# Patient Record
Sex: FEMALE | Race: OTHER | HISPANIC OR LATINO
[De-identification: names, ages, dates, MRNs, and addresses within clinical notes are randomized per-mention and may not be internally consistent; named-entity substitution may affect disease eponyms.]

---

## 2019-08-21 PROBLEM — Z00.00 ENCOUNTER FOR PREVENTIVE HEALTH EXAMINATION: Status: ACTIVE | Noted: 2019-08-21

## 2019-09-20 ENCOUNTER — APPOINTMENT (OUTPATIENT)
Dept: GASTROENTEROLOGY | Facility: CLINIC | Age: 59
End: 2019-09-20
Payer: COMMERCIAL

## 2019-09-20 VITALS
WEIGHT: 204 LBS | SYSTOLIC BLOOD PRESSURE: 161 MMHG | HEIGHT: 62 IN | DIASTOLIC BLOOD PRESSURE: 78 MMHG | BODY MASS INDEX: 37.54 KG/M2 | HEART RATE: 78 BPM

## 2019-09-20 DIAGNOSIS — Z78.9 OTHER SPECIFIED HEALTH STATUS: ICD-10-CM

## 2019-09-20 DIAGNOSIS — Z86.39 PERSONAL HISTORY OF OTHER ENDOCRINE, NUTRITIONAL AND METABOLIC DISEASE: ICD-10-CM

## 2019-09-20 DIAGNOSIS — Z86.79 PERSONAL HISTORY OF OTHER DISEASES OF THE CIRCULATORY SYSTEM: ICD-10-CM

## 2019-09-20 PROCEDURE — 99204 OFFICE O/P NEW MOD 45 MIN: CPT

## 2019-09-20 RX ORDER — LOVASTATIN 40 MG/1
TABLET ORAL
Refills: 0 | Status: ACTIVE | COMMUNITY

## 2019-09-20 RX ORDER — LOSARTAN POTASSIUM AND HYDROCHLOROTHIAZIDE 12.5; 5 MG/1; MG/1
50-12.5 TABLET ORAL
Refills: 0 | Status: ACTIVE | COMMUNITY

## 2019-09-20 RX ORDER — ASPIRIN 81 MG
81 TABLET, DELAYED RELEASE (ENTERIC COATED) ORAL
Refills: 0 | Status: ACTIVE | COMMUNITY

## 2019-09-20 RX ORDER — LORATADINE 5 MG
17 TABLET,CHEWABLE ORAL DAILY
Qty: 1 | Refills: 4 | Status: ACTIVE | COMMUNITY
Start: 2019-09-20 | End: 1900-01-01

## 2019-09-20 RX ORDER — WHEAT DEXTRIN 3 G/4 G
POWDER (GRAM) ORAL
Qty: 1 | Refills: 2 | Status: ACTIVE | COMMUNITY
Start: 2019-09-20 | End: 1900-01-01

## 2019-09-20 NOTE — PHYSICAL EXAM
[General Appearance - Alert] : alert [General Appearance - In No Acute Distress] : in no acute distress [Sclera] : the sclera and conjunctiva were normal [PERRL With Normal Accommodation] : pupils were equal in size, round, and reactive to light [Extraocular Movements] : extraocular movements were intact [Outer Ear] : the ears and nose were normal in appearance [Oropharynx] : the oropharynx was normal [Neck Appearance] : the appearance of the neck was normal [Neck Cervical Mass (___cm)] : no neck mass was observed [Jugular Venous Distention Increased] : there was no jugular-venous distention [Thyroid Diffuse Enlargement] : the thyroid was not enlarged [Thyroid Nodule] : there were no palpable thyroid nodules [Auscultation Breath Sounds / Voice Sounds] : lungs were clear to auscultation bilaterally [Heart Rate And Rhythm] : heart rate was normal and rhythm regular [Heart Sounds] : normal S1 and S2 [Heart Sounds Gallop] : no gallops [Murmurs] : no murmurs [Heart Sounds Pericardial Friction Rub] : no pericardial rub [Bowel Sounds] : normal bowel sounds [Abdomen Soft] : soft [Abdomen Tenderness] : non-tender [Abdomen Mass (___ Cm)] : no abdominal mass palpated [Abnormal Walk] : normal gait [Musculoskeletal - Swelling] : no joint swelling seen [Nail Clubbing] : no clubbing  or cyanosis of the fingernails [Motor Tone] : muscle strength and tone were normal [Skin Turgor] : normal skin turgor [Skin Color & Pigmentation] : normal skin color and pigmentation [No Focal Deficits] : no focal deficits [Oriented To Time, Place, And Person] : oriented to person, place, and time [Affect] : the affect was normal [Impaired Insight] : insight and judgment were intact [General Appearance - Well Nourished] : well nourished [General Appearance - Well-Appearing] : healthy appearing [General Appearance - Well Developed] : well developed [FreeTextEntry1] : overweight [] : normal voice and communication [Hearing Threshold Finger Rub Not [place]] : hearing was normal [Examination Of The Oral Cavity] : the lips and gums were normal [No CVA Tenderness] : no ~M costovertebral angle tenderness [No Spinal Tenderness] : no spinal tenderness [Motor Exam] : the motor exam was normal

## 2019-09-20 NOTE — HISTORY OF PRESENT ILLNESS
[Heartburn] : denies heartburn [Nausea] : denies nausea [Vomiting] : denies vomiting [Diarrhea] : denies diarrhea [Constipation] : improvement in constipation [Yellow Skin Or Eyes (Jaundice)] : denies jaundice [Abdominal Pain] : improved abdominal pain [Abdominal Swelling] : denies abdominal swelling [Rectal Pain] : denies rectal pain [de-identified] : WESTON CORRALES is a 59 year old Korean speaking female presenting today with complaints of constipation and bloating. She is a rather poor historian. She reports that she has always suffered from constipation, usually moving her bowels every 3 days. However, she reports that it has been worse in the last year or so but cannot quantify exactly how long. She states that she gets lower abdominal pain and cramping but again cannot state how often she gets it or how long it lasts. She reports bloating after meals. She denies any blood in the stool. She denies any upper GI symptoms such as nausea, vomiting, heart burn, acid regurgitation, or upper abdominal pain. She states that she went to her PCP at some point in the last few months, but again can't give a rough estimate, and was given "some pills and a liquid for constipation" but does not know what the medications were. She reports that they helped and she has felt better the last 2 weeks and has been having small bowel movements daily, however she stopped taking the meds once she started to feel better. She reports past use of Metamucil as well however she felt it did not fully help and stopped taking it. She reports having a colonoscopy with Dr. Johnson in the past but does not know how long ago. She states it "could have been 3-5 year or more". When asked if they found anything, she states that she doesn't remember but then said that they "did say there was something but I don't know what". When prompted about when the told her to follow up she said 10 years. \[facility]  Luis ID# [id number]

## 2019-09-20 NOTE — ASSESSMENT
[FreeTextEntry1] : The patient presents today with chronic constipation. She will start Benefiber 1 tablespoon in 6-8 oz of fluid every morning and MiraLAX 1 capful in 6-8 oz of fluid every evening. She will follow up in 6-8 weeks.\par We will obtain her previous records from Dr. Johnson's office. A decision as to the necessity of a followup colonoscopy or a CAT scan will depend upon her response to therapy and review of the prior colonoscopy report.

## 2019-11-08 ENCOUNTER — APPOINTMENT (OUTPATIENT)
Dept: GASTROENTEROLOGY | Facility: CLINIC | Age: 59
End: 2019-11-08
Payer: COMMERCIAL

## 2019-11-08 VITALS
DIASTOLIC BLOOD PRESSURE: 80 MMHG | SYSTOLIC BLOOD PRESSURE: 150 MMHG | HEIGHT: 62 IN | WEIGHT: 202 LBS | BODY MASS INDEX: 37.17 KG/M2 | RESPIRATION RATE: 15 BRPM | OXYGEN SATURATION: 98 % | HEART RATE: 56 BPM

## 2019-11-08 DIAGNOSIS — K59.00 CONSTIPATION, UNSPECIFIED: ICD-10-CM

## 2019-11-08 DIAGNOSIS — R14.0 ABDOMINAL DISTENSION (GASEOUS): ICD-10-CM

## 2019-11-08 PROCEDURE — 99214 OFFICE O/P EST MOD 30 MIN: CPT

## 2019-11-08 NOTE — ASSESSMENT
[FreeTextEntry1] : At this time I recommended that she increase Benefiber to one scoop twice daily and that she add MiraLax every evening. I will once again try to obtain the results of her prior colonoscopy. According to the patient she was told she should undergo followup exam in 10 years in the date of the last colonoscopy. She will call in 2 months for the results and any further followup will depend upon my review of that exam.

## 2019-11-08 NOTE — HISTORY OF PRESENT ILLNESS
[de-identified] : Since her last visit I have been unable to obtain the results of her last colonoscopy by Dr. Johnson. She has been taking one scoop Benefiber daily but has not been taking the MiraLax. Her bowel movements are somewhat more regular but they are still somewhat firm. There is no abdominal pain. Her appetite and weight continued to be stable. There is no rectal bleeding or melena.

## 2019-11-08 NOTE — PHYSICAL EXAM
[General Appearance - Alert] : alert [General Appearance - In No Acute Distress] : in no acute distress [General Appearance - Well Nourished] : well nourished [General Appearance - Well Developed] : well developed [General Appearance - Well-Appearing] : healthy appearing [Sclera] : the sclera and conjunctiva were normal [PERRL With Normal Accommodation] : pupils were equal in size, round, and reactive to light [Extraocular Movements] : extraocular movements were intact [Outer Ear] : the ears and nose were normal in appearance [Hearing Threshold Finger Rub Not [place]] : hearing was normal [Examination Of The Oral Cavity] : the lips and gums were normal [Oropharynx] : the oropharynx was normal [Neck Appearance] : the appearance of the neck was normal [Respiration, Rhythm And Depth] : normal respiratory rhythm and effort [Heart Rate And Rhythm] : heart rate was normal and rhythm regular [Bowel Sounds] : normal bowel sounds [Abdomen Soft] : soft [Abdomen Tenderness] : non-tender [Abdomen Mass (___ Cm)] : no abdominal mass palpated [No CVA Tenderness] : no ~M costovertebral angle tenderness [No Spinal Tenderness] : no spinal tenderness [Abnormal Walk] : normal gait [Nail Clubbing] : no clubbing  or cyanosis of the fingernails [Musculoskeletal - Swelling] : no joint swelling seen [Motor Tone] : muscle strength and tone were normal [Skin Color & Pigmentation] : normal skin color and pigmentation [Skin Turgor] : normal skin turgor [] : no rash [Motor Exam] : the motor exam was normal [No Focal Deficits] : no focal deficits [Oriented To Time, Place, And Person] : oriented to person, place, and time [Affect] : the affect was normal [Impaired Insight] : insight and judgment were intact [FreeTextEntry1] : overweight

## 2020-01-29 ENCOUNTER — EMERGENCY (EMERGENCY)
Facility: HOSPITAL | Age: 60
LOS: 1 days | Discharge: HOME | End: 2020-01-29
Attending: EMERGENCY MEDICINE
Payer: COMMERCIAL

## 2020-01-29 VITALS
HEIGHT: 62 IN | WEIGHT: 205.03 LBS | SYSTOLIC BLOOD PRESSURE: 179 MMHG | OXYGEN SATURATION: 97 % | DIASTOLIC BLOOD PRESSURE: 75 MMHG | TEMPERATURE: 98 F | RESPIRATION RATE: 18 BRPM | HEART RATE: 73 BPM

## 2020-01-29 PROCEDURE — 71046 X-RAY EXAM CHEST 2 VIEWS: CPT | Mod: 26

## 2020-01-29 PROCEDURE — 71046 X-RAY EXAM CHEST 2 VIEWS: CPT

## 2020-01-29 PROCEDURE — 99283 EMERGENCY DEPT VISIT LOW MDM: CPT | Mod: 25

## 2020-01-29 PROCEDURE — 99284 EMERGENCY DEPT VISIT MOD MDM: CPT

## 2020-01-29 PROCEDURE — T1013: CPT

## 2020-01-29 RX ADMIN — Medication 60 MILLIGRAM(S): at 20:05

## 2020-01-29 NOTE — ED STATDOCS - PATIENT PORTAL LINK FT
You can access the FollowMyHealth Patient Portal offered by [facility] by registering at the following website: [email or web address]. By joining [facility]’s FollowMyHealth portal, you will also be able to view your health information using other applications (apps) compatible with our system.

## 2020-01-29 NOTE — ED ADULT TRIAGE NOTE - CHIEF COMPLAINT QUOTE
Patient arrived ambulatory to ED, awake, alert, and oriented times 3, breathing unlabored.  patient complaining of cough for 2 months.

## 2020-01-29 NOTE — ED STATDOCS - OBJECTIVE STATEMENT
58 y/o F pt with significant PMHx of HLD, and HTN presents to the ED c/o productive cough for 2 months with associated wheezing. Pt reports that she went to the doctor and was diagnosed with pneumonia. She stated she was put on levofloxacin 750mg once a day 5 days ago, Symbicort, and cough syrup on January 14th. Pt states that she isn't feeling any relief from medications. Pt states that her  has bronchitis and received a negative flu test.

## 2020-01-29 NOTE — ED STATDOCS - CLINICAL SUMMARY MEDICAL DECISION MAKING FREE TEXT BOX
pt presents with productive cough for 2 months, multiple doses of abx, on inhaled steroids currently. Will repeat xray to rule out PNA start on prednisone for bronchitis and have pt follow up with pulmonologist

## 2020-02-10 PROBLEM — Z78.9 OTHER SPECIFIED HEALTH STATUS: Chronic | Status: ACTIVE | Noted: 2020-02-06

## 2020-02-17 ENCOUNTER — APPOINTMENT (OUTPATIENT)
Dept: PULMONOLOGY | Facility: CLINIC | Age: 60
End: 2020-02-17
Payer: COMMERCIAL

## 2020-02-17 VITALS
SYSTOLIC BLOOD PRESSURE: 130 MMHG | HEART RATE: 73 BPM | OXYGEN SATURATION: 97 % | WEIGHT: 206 LBS | HEIGHT: 62 IN | DIASTOLIC BLOOD PRESSURE: 76 MMHG | BODY MASS INDEX: 37.91 KG/M2

## 2020-02-17 DIAGNOSIS — Z87.891 PERSONAL HISTORY OF NICOTINE DEPENDENCE: ICD-10-CM

## 2020-02-17 DIAGNOSIS — Z78.9 OTHER SPECIFIED HEALTH STATUS: ICD-10-CM

## 2020-02-17 DIAGNOSIS — Z87.01 PERSONAL HISTORY OF PNEUMONIA (RECURRENT): ICD-10-CM

## 2020-02-17 PROCEDURE — 99204 OFFICE O/P NEW MOD 45 MIN: CPT | Mod: 25

## 2020-02-17 PROCEDURE — 94664 DEMO&/EVAL PT USE INHALER: CPT | Mod: 59

## 2020-02-17 PROCEDURE — 94060 EVALUATION OF WHEEZING: CPT

## 2020-02-17 NOTE — REASON FOR VISIT
[Consultation] : a consultation [Abnormal CXR/ Chest CT] : an abnormal CXR/ chest CT [Cough] : cough

## 2020-02-17 NOTE — CONSULT LETTER
[Dear  ___] : Dear  [unfilled], [Please see my note below.] : Please see my note below. [FreeTextEntry3] : Renaldo Cooley MD\par  [Consult Closing:] : Thank you very much for allowing me to participate in the care of this patient.  If you have any questions, please do not hesitate to contact me. [Consult Letter:] : I had the pleasure of evaluating your patient, [unfilled].

## 2020-02-17 NOTE — PHYSICAL EXAM
[No Acute Distress] : no acute distress [Normal Oropharynx] : normal oropharynx [No Neck Mass] : no neck mass [Normal Appearance] : normal appearance [Normal S1, S2] : normal s1, s2 [No Murmurs] : no murmurs [Normal Rate/Rhythm] : normal rate/rhythm [Clear to Auscultation Bilaterally] : clear to auscultation bilaterally [No Abnormalities] : no abnormalities [No Resp Distress] : no resp distress [Benign] : benign [No Clubbing] : no clubbing [Normal Gait] : normal gait [No Cyanosis] : no cyanosis [No Edema] : no edema [FROM] : FROM [No Focal Deficits] : no focal deficits [Normal Color/ Pigmentation] : normal color/ pigmentation [Oriented x3] : oriented x3 [Normal Affect] : normal affect

## 2020-03-02 ENCOUNTER — APPOINTMENT (OUTPATIENT)
Dept: CT IMAGING | Facility: CLINIC | Age: 60
End: 2020-03-02

## 2020-03-30 ENCOUNTER — APPOINTMENT (OUTPATIENT)
Dept: PULMONOLOGY | Facility: CLINIC | Age: 60
End: 2020-03-30

## 2020-04-23 ENCOUNTER — APPOINTMENT (OUTPATIENT)
Dept: PULMONOLOGY | Facility: CLINIC | Age: 60
End: 2020-04-23

## 2020-04-23 RX ORDER — CLARITHROMYCIN 500 MG/1
500 TABLET, FILM COATED ORAL
Refills: 0 | Status: DISCONTINUED | COMMUNITY
End: 2020-04-23

## 2020-04-28 ENCOUNTER — APPOINTMENT (OUTPATIENT)
Dept: PULMONOLOGY | Facility: CLINIC | Age: 60
End: 2020-04-28

## 2020-05-08 ENCOUNTER — APPOINTMENT (OUTPATIENT)
Dept: PULMONOLOGY | Facility: CLINIC | Age: 60
End: 2020-05-08
Payer: COMMERCIAL

## 2020-05-08 VITALS
SYSTOLIC BLOOD PRESSURE: 128 MMHG | HEIGHT: 63 IN | DIASTOLIC BLOOD PRESSURE: 84 MMHG | BODY MASS INDEX: 36.14 KG/M2 | OXYGEN SATURATION: 98 % | WEIGHT: 204 LBS | HEART RATE: 77 BPM

## 2020-05-08 PROCEDURE — 99213 OFFICE O/P EST LOW 20 MIN: CPT

## 2020-05-08 NOTE — PHYSICAL EXAM
[No Acute Distress] : no acute distress [Normal Oropharynx] : normal oropharynx [No Neck Mass] : no neck mass [Normal Appearance] : normal appearance [Normal Rate/Rhythm] : normal rate/rhythm [Normal S1, S2] : normal s1, s2 [No Murmurs] : no murmurs [No Resp Distress] : no resp distress [Clear to Auscultation Bilaterally] : clear to auscultation bilaterally [No Abnormalities] : no abnormalities [Benign] : benign [Normal Gait] : normal gait [No Clubbing] : no clubbing [No Cyanosis] : no cyanosis [No Edema] : no edema [FROM] : FROM [Normal Color/ Pigmentation] : normal color/ pigmentation [No Focal Deficits] : no focal deficits [Oriented x3] : oriented x3 [Normal Affect] : normal affect

## 2020-05-08 NOTE — CONSULT LETTER
[Dear  ___] : Dear  [unfilled], [Courtesy Letter:] : I had the pleasure of seeing your patient, [unfilled], in my office today. [Please see my note below.] : Please see my note below. [Consult Closing:] : Thank you very much for allowing me to participate in the care of this patient.  If you have any questions, please do not hesitate to contact me. [Sincerely,] : Sincerely, [FreeTextEntry3] : Renaldo Cooley MD\par

## 2020-08-13 ENCOUNTER — APPOINTMENT (OUTPATIENT)
Dept: PULMONOLOGY | Facility: CLINIC | Age: 60
End: 2020-08-13

## 2021-06-09 ENCOUNTER — APPOINTMENT (OUTPATIENT)
Dept: PULMONOLOGY | Facility: CLINIC | Age: 61
End: 2021-06-09
Payer: COMMERCIAL

## 2021-06-09 VITALS
HEIGHT: 63 IN | TEMPERATURE: 98.4 F | WEIGHT: 204 LBS | HEART RATE: 75 BPM | SYSTOLIC BLOOD PRESSURE: 140 MMHG | OXYGEN SATURATION: 98 % | BODY MASS INDEX: 36.14 KG/M2 | DIASTOLIC BLOOD PRESSURE: 80 MMHG

## 2021-06-09 DIAGNOSIS — R94.2 ABNORMAL RESULTS OF PULMONARY FUNCTION STUDIES: ICD-10-CM

## 2021-06-09 DIAGNOSIS — Z23 ENCOUNTER FOR IMMUNIZATION: ICD-10-CM

## 2021-06-09 PROCEDURE — 99072 ADDL SUPL MATRL&STAF TM PHE: CPT

## 2021-06-09 PROCEDURE — 99214 OFFICE O/P EST MOD 30 MIN: CPT

## 2021-06-09 RX ORDER — ESCITALOPRAM OXALATE 10 MG/1
10 TABLET ORAL
Refills: 0 | Status: ACTIVE | COMMUNITY
Start: 2021-06-09

## 2021-06-09 RX ORDER — BUDESONIDE AND FORMOTEROL FUMARATE DIHYDRATE 80; 4.5 UG/1; UG/1
80-4.5 AEROSOL RESPIRATORY (INHALATION) TWICE DAILY
Qty: 1 | Refills: 3 | Status: ACTIVE | COMMUNITY
Start: 1900-01-01 | End: 1900-01-01

## 2021-06-09 RX ORDER — FAMOTIDINE 40 MG/1
40 TABLET, FILM COATED ORAL
Qty: 30 | Refills: 1 | Status: ACTIVE | COMMUNITY
Start: 2021-06-09 | End: 1900-01-01

## 2021-07-08 ENCOUNTER — APPOINTMENT (OUTPATIENT)
Dept: PULMONOLOGY | Facility: CLINIC | Age: 61
End: 2021-07-08
Payer: COMMERCIAL

## 2021-07-08 VITALS — HEIGHT: 62 IN | BODY MASS INDEX: 37.91 KG/M2 | WEIGHT: 206 LBS

## 2021-07-08 VITALS
DIASTOLIC BLOOD PRESSURE: 82 MMHG | OXYGEN SATURATION: 98 % | HEIGHT: 62 IN | BODY MASS INDEX: 37.68 KG/M2 | SYSTOLIC BLOOD PRESSURE: 140 MMHG | RESPIRATION RATE: 16 BRPM | HEART RATE: 72 BPM

## 2021-07-08 DIAGNOSIS — R53.82 CHRONIC FATIGUE, UNSPECIFIED: ICD-10-CM

## 2021-07-08 DIAGNOSIS — G47.33 OBSTRUCTIVE SLEEP APNEA (ADULT) (PEDIATRIC): ICD-10-CM

## 2021-07-08 DIAGNOSIS — K21.9 GASTRO-ESOPHAGEAL REFLUX DISEASE W/OUT ESOPHAGITIS: ICD-10-CM

## 2021-07-08 DIAGNOSIS — Z87.09 PERSONAL HISTORY OF OTHER DISEASES OF THE RESPIRATORY SYSTEM: ICD-10-CM

## 2021-07-08 DIAGNOSIS — J45.30 MILD PERSISTENT ASTHMA, UNCOMPLICATED: ICD-10-CM

## 2021-07-08 DIAGNOSIS — E66.01 MORBID (SEVERE) OBESITY DUE TO EXCESS CALORIES: ICD-10-CM

## 2021-07-08 PROCEDURE — 99072 ADDL SUPL MATRL&STAF TM PHE: CPT

## 2021-07-08 PROCEDURE — 99214 OFFICE O/P EST MOD 30 MIN: CPT | Mod: 25

## 2021-07-08 PROCEDURE — 94010 BREATHING CAPACITY TEST: CPT

## 2021-07-08 NOTE — REASON FOR VISIT
[Follow-Up] : a follow-up visit [Asthma] : asthma [Cough] : cough [Shortness of Breath] : shortness of breath [Obesity] : obesity

## 2021-07-24 ENCOUNTER — APPOINTMENT (OUTPATIENT)
Age: 61
End: 2021-07-24

## 2021-10-04 ENCOUNTER — APPOINTMENT (OUTPATIENT)
Age: 61
End: 2021-10-04

## 2021-10-05 ENCOUNTER — APPOINTMENT (OUTPATIENT)
Dept: ORTHOPEDIC SURGERY | Facility: CLINIC | Age: 61
End: 2021-10-05

## 2021-10-07 ENCOUNTER — APPOINTMENT (OUTPATIENT)
Dept: ORTHOPEDIC SURGERY | Facility: CLINIC | Age: 61
End: 2021-10-07
Payer: COMMERCIAL

## 2021-10-07 VITALS
SYSTOLIC BLOOD PRESSURE: 155 MMHG | DIASTOLIC BLOOD PRESSURE: 78 MMHG | HEIGHT: 62 IN | HEART RATE: 76 BPM | BODY MASS INDEX: 38.09 KG/M2 | WEIGHT: 207 LBS

## 2021-10-07 DIAGNOSIS — M25.511 PAIN IN RIGHT SHOULDER: ICD-10-CM

## 2021-10-07 DIAGNOSIS — M65.20 CALCIFIC TENDINITIS, UNSPECIFIED SITE: ICD-10-CM

## 2021-10-07 PROCEDURE — 99204 OFFICE O/P NEW MOD 45 MIN: CPT

## 2021-10-07 RX ORDER — MELOXICAM 15 MG/1
15 TABLET ORAL
Qty: 30 | Refills: 0 | Status: ACTIVE | COMMUNITY
Start: 2021-10-07 | End: 1900-01-01

## 2021-10-07 NOTE — DISCUSSION/SUMMARY
[Medication Risks Reviewed] : Medication risks reviewed [de-identified] : Patient is a 61-year-old female with mild case of calcific tendinitis of her right shoulder.  I recommended conservative treatment at this time.  I given her prescription for physical therapy.  I also recommend Loxitane 15 mg daily as needed for pain.  I will see her back in 6 weeks for repeat examination.  If if no improvement or worsening of her symptoms we will consider cortisone injection at that time.  All questions asked and answered with an .

## 2021-10-07 NOTE — HISTORY OF PRESENT ILLNESS
[de-identified] : Patient is a 61-year-old female presents with a chief complaint of right shoulder pain has been going on for the past 1 to 2 months.  Patient denies any history of trauma however she states that she saw her physician who ordered an x-ray and said she had inflammation in her shoulder.  She denies any neurovascular compromise.  Denies any pain in the neck.  States that the Aleve she takes helps with the pain.  Denies any loss of range of motion.

## 2021-10-07 NOTE — PHYSICAL EXAM
[de-identified] : GENERAL APPEARANCE: Well nourished and hydrated, pleasant, alert, and oriented x 3. Appears their stated age. \par HEENT: Normocephalic, extraocular eye motion intact. Nasal septum midline. Oral cavity clear. External auditory canal clear. \par RESPIRATORY: Breath sounds clear and audible in all lobes. No wheezing, No accessory muscle use.\par CARDIOVASCULAR: No apparent abnormalities. No lower leg edema. No varicosities. Pedal pulses are palpable.\par NEUROLOGIC: Sensation is normal, no muscle weakness in the upper or lower extremities.\par DERMATOLOGIC: No apparent skin lesions, moist, warm, no rash.\par SPINE: Cervical spine appears normal and moves freely; thoracic spine appears normal and moves freely; lumbosacral spine appears normal and moves freely, normal, nontender.\par MUSCULOSKELETAL: Hands, wrists, and elbows are normal and move freely, \par Psychiatric: Oriented to person, place, and time, insight and judgement were intact and the affect was normal. \par Bilateral upper extremities: Full active and passive range of motion of bilateral shoulders, negative empty can bilaterally, negative La Ward's bilaterally, negative Hornblower's, negative belly press, AIN PIN median radial ulnar nerves intact, fingers warm well-perfused brisk cap refill [de-identified] : 2 views of the right shoulder brought in from outside show no acute fracture dislocation.  A small area of calcific tendinitis is noted.

## 2022-07-13 ENCOUNTER — APPOINTMENT (OUTPATIENT)
Dept: UROLOGY | Facility: CLINIC | Age: 62
End: 2022-07-13

## 2022-07-13 VITALS
HEIGHT: 62 IN | WEIGHT: 215 LBS | BODY MASS INDEX: 39.56 KG/M2 | HEART RATE: 64 BPM | DIASTOLIC BLOOD PRESSURE: 74 MMHG | SYSTOLIC BLOOD PRESSURE: 162 MMHG

## 2022-07-13 PROCEDURE — 99203 OFFICE O/P NEW LOW 30 MIN: CPT

## 2022-07-13 NOTE — ASSESSMENT
[FreeTextEntry1] : plan:\par - ordered new ultrasound\par - next visit with results to discuss any need for follow up\par

## 2022-07-13 NOTE — HISTORY OF PRESENT ILLNESS
[FreeTextEntry1] : 61 yo F for initial consultation\par PMH: HTN, HLD, JAKY, GERD, Asthma\par PSH: cesarian section, hysterectomy (not malignant), left lumpectomy (benign)\par NKDA\par \par interview performed with Cuban interpretation \par no previous urologic history\par describes an ultrasound that mentions cyst on the kidney \par has no complains, no pain, no dysuria, no hematuria\par the ultrasound was 1 year ago, does not remember where and does not have the report\par \par now feeling well\par plan:\par - ordered new ultrasound\par - next visit with results to discuss any need for follow up\par \par

## 2022-07-16 ENCOUNTER — APPOINTMENT (OUTPATIENT)
Dept: ULTRASOUND IMAGING | Facility: CLINIC | Age: 62
End: 2022-07-16

## 2022-07-16 ENCOUNTER — OUTPATIENT (OUTPATIENT)
Dept: OUTPATIENT SERVICES | Facility: HOSPITAL | Age: 62
LOS: 1 days | End: 2022-07-16
Payer: MEDICAID

## 2022-07-16 DIAGNOSIS — N28.1 CYST OF KIDNEY, ACQUIRED: ICD-10-CM

## 2022-07-16 PROCEDURE — 76770 US EXAM ABDO BACK WALL COMP: CPT | Mod: 26

## 2022-07-16 PROCEDURE — 76770 US EXAM ABDO BACK WALL COMP: CPT

## 2022-07-29 ENCOUNTER — APPOINTMENT (OUTPATIENT)
Dept: UROLOGY | Facility: CLINIC | Age: 62
End: 2022-07-29

## 2022-07-29 PROCEDURE — 99214 OFFICE O/P EST MOD 30 MIN: CPT

## 2022-07-29 NOTE — HISTORY OF PRESENT ILLNESS
[FreeTextEntry1] : 61 yo F for initial consultation\par PMH: HTN, HLD, JAKY, GERD, Asthma\par PSH: cesarian section, hysterectomy (not malignant), left lumpectomy (benign)\par NKDA\par \par interview performed with [place] interpretation \par no previous urologic history\par describes an ultrasound that mentions cyst on the kidney \par \par new ultrasound: known 1.1 cm left renal cyst. suspected 10 mm lower pole right renal stone\par discussed the findings with the patient\par \par plan:\par - new CT\par - next visit  to discuss the results and decide on need for treatment

## 2022-07-29 NOTE — ASSESSMENT
[FreeTextEntry1] : \par plan:\par - new CT\par - next visit  to discuss the results and decide on need for treatment

## 2022-08-06 ENCOUNTER — OUTPATIENT (OUTPATIENT)
Dept: OUTPATIENT SERVICES | Facility: HOSPITAL | Age: 62
LOS: 1 days | End: 2022-08-06
Payer: MEDICAID

## 2022-08-06 ENCOUNTER — APPOINTMENT (OUTPATIENT)
Dept: CT IMAGING | Facility: CLINIC | Age: 62
End: 2022-08-06

## 2022-08-06 DIAGNOSIS — Z00.8 ENCOUNTER FOR OTHER GENERAL EXAMINATION: ICD-10-CM

## 2022-08-06 PROCEDURE — 74176 CT ABD & PELVIS W/O CONTRAST: CPT

## 2022-08-06 PROCEDURE — 74176 CT ABD & PELVIS W/O CONTRAST: CPT | Mod: 26

## 2022-08-12 ENCOUNTER — APPOINTMENT (OUTPATIENT)
Dept: UROLOGY | Facility: CLINIC | Age: 62
End: 2022-08-12

## 2022-08-12 DIAGNOSIS — N21.0 CALCULUS IN BLADDER: ICD-10-CM

## 2022-08-12 PROCEDURE — 99213 OFFICE O/P EST LOW 20 MIN: CPT

## 2022-08-14 NOTE — HISTORY OF PRESENT ILLNESS
[FreeTextEntry1] : 61 yo F for follow up\par asymptomatic\par here for follow up due to recent ultrasound with renal cyst and suspected stone\par see previous note\par \par \par CT: no hydro, no stones, small cyst, suspected calcification in the bladder\par no need for cystoscopy now, patient is also asymptomatic \par reviewed all images with the patient and explained the findings\par \par \par \par plan:\par - next visit in 6 months with new ultrasound

## 2022-08-14 NOTE — PHYSICAL EXAM
[General Appearance - Well Developed] : well developed [General Appearance - Well Nourished] : well nourished [Normal Appearance] : normal appearance [Well Groomed] : well groomed [Edema] : no peripheral edema [General Appearance - In No Acute Distress] : no acute distress [] : no respiratory distress [Respiration, Rhythm And Depth] : normal respiratory rhythm and effort [Exaggerated Use Of Accessory Muscles For Inspiration] : no accessory muscle use [Affect] : the affect was normal [Oriented To Time, Place, And Person] : oriented to person, place, and time [Mood] : the mood was normal [Not Anxious] : not anxious [Normal Station and Gait] : the gait and station were normal for the patient's age

## 2023-02-10 ENCOUNTER — APPOINTMENT (OUTPATIENT)
Dept: UROLOGY | Facility: CLINIC | Age: 63
End: 2023-02-10
Payer: MEDICAID

## 2023-02-10 VITALS
SYSTOLIC BLOOD PRESSURE: 153 MMHG | OXYGEN SATURATION: 97 % | RESPIRATION RATE: 17 BRPM | HEART RATE: 69 BPM | DIASTOLIC BLOOD PRESSURE: 80 MMHG

## 2023-02-10 PROCEDURE — 99213 OFFICE O/P EST LOW 20 MIN: CPT

## 2023-02-10 NOTE — HISTORY OF PRESENT ILLNESS
[FreeTextEntry1] : 63 yo F for follow up\par asymptomatic\par \par interview performed with Persian interpretation \par 7/2022 seen with ultrasound showing renal cyst and suspected 10 mm stone\par CT: no hydro, no stones, small cyst, suspected calcification in the bladder\par \par reviewed with the patient, decided no need for cystoscopy at the time\par was planned to come for follow up with new ultrasound\par here for follow up, feeling well with no urinary symptoms\par describes some low energy and feeling tired recently, saw her PCP\par \par patient not sure if she already had her ultrasound\par checked in the [employer] system and [facility] with no results\par \par plan:\par - ultrasound and KUB\par - next visit in 1-2 weeks to review

## 2023-02-18 ENCOUNTER — APPOINTMENT (OUTPATIENT)
Dept: ULTRASOUND IMAGING | Facility: CLINIC | Age: 63
End: 2023-02-18
Payer: MEDICAID

## 2023-02-18 ENCOUNTER — OUTPATIENT (OUTPATIENT)
Dept: OUTPATIENT SERVICES | Facility: HOSPITAL | Age: 63
LOS: 1 days | End: 2023-02-18
Payer: MEDICAID

## 2023-02-18 DIAGNOSIS — N20.0 CALCULUS OF KIDNEY: ICD-10-CM

## 2023-02-18 PROCEDURE — 74018 RADEX ABDOMEN 1 VIEW: CPT | Mod: 26

## 2023-02-18 PROCEDURE — 74018 RADEX ABDOMEN 1 VIEW: CPT

## 2023-02-18 PROCEDURE — 76770 US EXAM ABDO BACK WALL COMP: CPT | Mod: 26

## 2023-02-18 PROCEDURE — 76770 US EXAM ABDO BACK WALL COMP: CPT

## 2023-02-24 ENCOUNTER — APPOINTMENT (OUTPATIENT)
Dept: UROLOGY | Facility: CLINIC | Age: 63
End: 2023-02-24
Payer: MEDICAID

## 2023-02-24 PROCEDURE — 99214 OFFICE O/P EST MOD 30 MIN: CPT

## 2023-02-24 RX ORDER — FLUCONAZOLE 150 MG/1
150 TABLET ORAL
Qty: 2 | Refills: 0 | Status: ACTIVE | COMMUNITY
Start: 2023-02-24 | End: 1900-01-01

## 2023-02-24 NOTE — HISTORY OF PRESENT ILLNESS
[None] : None [FreeTextEntry1] : 61 yo F for follow up\par asymptomatic\par \par interview performed with Turkish interpretation \par 7/2022 seen with ultrasound showing renal cyst and suspected 10 mm stone\par CT: no hydro, no stones, small cyst, suspected small calcification in the bladder\par \par here for follow up\par feeling well\par new ultrasound: right kidney 4+6 mm stones in the lower pole\par new KUB: no stones\par reviewed previous CT from 8/2022 with no renal stones at the time\par \par discussed with the patient\par either the ultrasound is not accurate and there are no stones, or she has new small stones in the kidney\par no need for a new CT at this point\par will only schedule follow up with repeat ultrasound\par \par plan:\par - fluid intake\par - next visit in 3 months with new 24 hr and office sono\par - if stones grow will need new CT to confirm

## 2023-02-24 NOTE — ASSESSMENT
[FreeTextEntry1] : \par plan:\par - fluid intake\par - next visit in 3 months with new 24 hr and office sono\par - if stones grow will need new CT to confirm

## 2023-04-14 ENCOUNTER — APPOINTMENT (OUTPATIENT)
Dept: UROLOGY | Facility: CLINIC | Age: 63
End: 2023-04-14
Payer: MEDICAID

## 2023-04-14 VITALS
RESPIRATION RATE: 17 BRPM | HEART RATE: 67 BPM | SYSTOLIC BLOOD PRESSURE: 156 MMHG | OXYGEN SATURATION: 96 % | DIASTOLIC BLOOD PRESSURE: 75 MMHG

## 2023-04-14 PROCEDURE — 99214 OFFICE O/P EST MOD 30 MIN: CPT

## 2023-04-14 NOTE — HISTORY OF PRESENT ILLNESS
[FreeTextEntry1] : 63 yo F for follow up\par asymptomatic\par \par interview performed with Mohawk interpretation \par 7/2022 seen with ultrasound showing renal cyst and suspected 10 mm stone\par CT: no hydro, no stones, small cyst, suspected small calcification in the bladder\par last visit 02/2023 with ultrasound: right kidney 4+6 mm stones in the lower pole\par new KUB: no stones\par reviewed previous CT from 8/2022 with no renal stones at the time\par \par \par here for follow up, still feeling well\par had some lower back pain before that has improved\par no new ultrasound\par reviewed 24 hr: good volume, high calcium in one that correlates with a higher Na, also high Ox in one test, low citrate\par \par discussed urine collections, discussed in depth need for diatery changes and provided printed information on salt in diet, oxalate in food, and adding citrate to the water. will wait with any medical treatment for now. still needs a new ultrasound\par \par \par plan:\par - reduce sodium in diet\par - add lemon and lime\par - reduce oxalate in diet\par - new ultrasound\par - will update results over the phone\par - next visit with 24 hr and office ultrasound in 3-4 months

## 2023-04-14 NOTE — ASSESSMENT
[FreeTextEntry1] : \par plan:\par - reduce sodium in diet\par - add lemon and lime\par - reduce oxalate in diet\par - new ultrasound\par - will update results over the phone\par - next visit with 24 hr and office ultrasound in 3-4 months

## 2023-06-07 ENCOUNTER — APPOINTMENT (OUTPATIENT)
Dept: UROLOGY | Facility: CLINIC | Age: 63
End: 2023-06-07

## 2023-06-14 ENCOUNTER — APPOINTMENT (OUTPATIENT)
Dept: UROLOGY | Facility: CLINIC | Age: 63
End: 2023-06-14
Payer: MEDICAID

## 2023-06-14 PROCEDURE — 99213 OFFICE O/P EST LOW 20 MIN: CPT

## 2023-06-15 NOTE — HISTORY OF PRESENT ILLNESS
[FreeTextEntry1] : 63 yo F for follow up\par asymptomatic\par interview performed with Montenegrin interpretation \par 7/2022 seen with ultrasound showing renal cyst and suspected 10 mm stone\par 02/2023 with ultrasound: right kidney 4+6 mm stones in the lower pole\par reviewed previous CT from 8/2022 with no renal stones at the time\par new KUB: no stones\par \par last visit 4/14/2023 with 24 hr urine: good volume, high calcium in one that correlates with a higher Na, also high Ox in one test, low citrate\par \par here for follow up, feeling well\par reviewed ultrasound 2 weeks after last visit (tomas)\par - no stones, no hydro\par - renal cysts only\par \par discussed with patient, no need for further imaging now\par \par plan:\par - 3 months with new 24 hr urine

## 2023-08-17 ENCOUNTER — OFFICE (OUTPATIENT)
Dept: URBAN - METROPOLITAN AREA CLINIC 94 | Facility: CLINIC | Age: 63
Setting detail: OPHTHALMOLOGY
End: 2023-08-17
Payer: COMMERCIAL

## 2023-08-17 DIAGNOSIS — E11.3293: ICD-10-CM

## 2023-08-17 DIAGNOSIS — H25.13: ICD-10-CM

## 2023-08-17 DIAGNOSIS — Z79.84: ICD-10-CM

## 2023-08-17 DIAGNOSIS — H43.393: ICD-10-CM

## 2023-08-17 DIAGNOSIS — H35.033: ICD-10-CM

## 2023-08-17 DIAGNOSIS — H40.003: ICD-10-CM

## 2023-08-17 DIAGNOSIS — H16.223: ICD-10-CM

## 2023-08-17 PROCEDURE — 92004 COMPRE OPH EXAM NEW PT 1/>: CPT | Performed by: OPHTHALMOLOGY

## 2023-08-17 PROCEDURE — 92250 FUNDUS PHOTOGRAPHY W/I&R: CPT | Performed by: OPHTHALMOLOGY

## 2023-08-17 ASSESSMENT — REFRACTION_AUTOREFRACTION
OD_AXIS: 010
OS_AXIS: 159
OS_SPHERE: +1.50
OD_SPHERE: +1.75
OS_CYLINDER: -1.50
OD_CYLINDER: -1.75

## 2023-08-17 ASSESSMENT — TONOMETRY
OD_IOP_MMHG: 16
OD_IOP_MMHG: 18
OS_IOP_MMHG: 16
OS_IOP_MMHG: 16

## 2023-08-17 ASSESSMENT — REFRACTION_MANIFEST
OS_AXIS: 170
OD_CYLINDER: -1.50
OD_SPHERE: +1.00
OD_AXIS: 15
OD_ADD: +2.25
OS_CYLINDER: -1.50
OD_VA1: 20/25
OS_VA1: 20/20-
OS_ADD: +2.23

## 2023-08-17 ASSESSMENT — PACHYMETRY
OD_CT_UM: 549
OD_CT_CORRECTION: 0
OS_CT_UM: 551
OS_CT_CORRECTION: -1

## 2023-08-17 ASSESSMENT — KERATOMETRY
OS_AXISANGLE_DEGREES: 079
OD_AXISANGLE_DEGREES: 094
OD_K1POWER_DIOPTERS: 42.75
METHOD_AUTO_MANUAL: AUTO
OS_K1POWER_DIOPTERS: 43.25
OD_K2POWER_DIOPTERS: 45.00
OS_K2POWER_DIOPTERS: 45.25

## 2023-08-17 ASSESSMENT — VISUAL ACUITY
OD_BCVA: 20/25
OS_BCVA: 20/25

## 2023-08-17 ASSESSMENT — SPHEQUIV_DERIVED
OS_SPHEQUIV: 0.75
OD_SPHEQUIV: 0.25
OD_SPHEQUIV: 0.875

## 2023-08-17 ASSESSMENT — AXIALLENGTH_DERIVED
OD_AL: 23.1228
OD_AL: 23.3591
OS_AL: 23.0376

## 2023-08-17 ASSESSMENT — SUPERFICIAL PUNCTATE KERATITIS (SPK)
OD_SPK: T
OS_SPK: T

## 2023-08-17 ASSESSMENT — CONFRONTATIONAL VISUAL FIELD TEST (CVF)
OD_FINDINGS: FULL
OS_FINDINGS: FULL

## 2023-09-11 ENCOUNTER — OFFICE (OUTPATIENT)
Dept: URBAN - METROPOLITAN AREA CLINIC 94 | Facility: CLINIC | Age: 63
Setting detail: OPHTHALMOLOGY
End: 2023-09-11
Payer: COMMERCIAL

## 2023-09-11 DIAGNOSIS — H43.393: ICD-10-CM

## 2023-09-11 DIAGNOSIS — H35.033: ICD-10-CM

## 2023-09-11 DIAGNOSIS — E11.9: ICD-10-CM

## 2023-09-11 PROCEDURE — 92134 CPTRZ OPH DX IMG PST SGM RTA: CPT | Performed by: SPECIALIST

## 2023-09-11 PROCEDURE — 92014 COMPRE OPH EXAM EST PT 1/>: CPT | Performed by: SPECIALIST

## 2023-09-11 PROCEDURE — 92235 FLUORESCEIN ANGRPH MLTIFRAME: CPT | Performed by: SPECIALIST

## 2023-09-11 ASSESSMENT — KERATOMETRY
OD_K2POWER_DIOPTERS: 45.00
OS_K2POWER_DIOPTERS: 45.25
OS_AXISANGLE_DEGREES: 079
METHOD_AUTO_MANUAL: AUTO
OS_K1POWER_DIOPTERS: 43.25
OD_AXISANGLE_DEGREES: 094
OD_K1POWER_DIOPTERS: 42.75

## 2023-09-11 ASSESSMENT — REFRACTION_AUTOREFRACTION
OD_AXIS: 010
OS_CYLINDER: -1.50
OD_SPHERE: +1.75
OS_AXIS: 159
OD_CYLINDER: -1.75
OS_SPHERE: +1.50

## 2023-09-11 ASSESSMENT — TONOMETRY
OS_IOP_MMHG: 19
OD_IOP_MMHG: 16

## 2023-09-11 ASSESSMENT — PACHYMETRY
OS_CT_UM: 551
OS_CT_CORRECTION: -1
OD_CT_UM: 549
OD_CT_CORRECTION: 0

## 2023-09-11 ASSESSMENT — SUPERFICIAL PUNCTATE KERATITIS (SPK)
OS_SPK: T
OD_SPK: T

## 2023-09-11 ASSESSMENT — VISUAL ACUITY
OS_BCVA: 20/25
OD_BCVA: 20/20

## 2023-09-11 ASSESSMENT — CONFRONTATIONAL VISUAL FIELD TEST (CVF)
OS_FINDINGS: FULL
OD_FINDINGS: FULL

## 2023-09-11 ASSESSMENT — AXIALLENGTH_DERIVED
OD_AL: 23.1228
OS_AL: 23.0376

## 2023-09-11 ASSESSMENT — SPHEQUIV_DERIVED
OD_SPHEQUIV: 0.875
OS_SPHEQUIV: 0.75

## 2023-09-13 ENCOUNTER — APPOINTMENT (OUTPATIENT)
Dept: UROLOGY | Facility: CLINIC | Age: 63
End: 2023-09-13
Payer: MEDICAID

## 2023-09-13 PROCEDURE — 99214 OFFICE O/P EST MOD 30 MIN: CPT

## 2023-09-29 ENCOUNTER — OFFICE (OUTPATIENT)
Dept: URBAN - METROPOLITAN AREA CLINIC 94 | Facility: CLINIC | Age: 63
Setting detail: OPHTHALMOLOGY
End: 2023-09-29
Payer: COMMERCIAL

## 2023-09-29 DIAGNOSIS — H16.223: ICD-10-CM

## 2023-09-29 DIAGNOSIS — H25.13: ICD-10-CM

## 2023-09-29 DIAGNOSIS — H40.003: ICD-10-CM

## 2023-09-29 DIAGNOSIS — H35.033: ICD-10-CM

## 2023-09-29 PROBLEM — E11.9 DIABETES TYPE 2 NO RETINOPATHY: Status: ACTIVE | Noted: 2023-09-11

## 2023-09-29 PROCEDURE — 92133 CPTRZD OPH DX IMG PST SGM ON: CPT | Performed by: OPHTHALMOLOGY

## 2023-09-29 PROCEDURE — 99213 OFFICE O/P EST LOW 20 MIN: CPT | Performed by: OPHTHALMOLOGY

## 2023-09-29 ASSESSMENT — REFRACTION_AUTOREFRACTION
OD_SPHERE: +1.75
OD_CYLINDER: -2.00
OS_CYLINDER: -1.75
OD_AXIS: 007
OS_AXIS: 158
OS_SPHERE: +1.75

## 2023-09-29 ASSESSMENT — TONOMETRY
OS_IOP_MMHG: 15
OS_IOP_MMHG: 18
OD_IOP_MMHG: 20
OD_IOP_MMHG: 15

## 2023-09-29 ASSESSMENT — PACHYMETRY
OS_CT_CORRECTION: -1
OD_CT_CORRECTION: 0
OD_CT_UM: 549
OS_CT_UM: 551

## 2023-09-29 ASSESSMENT — VISUAL ACUITY
OD_BCVA: 20/20
OS_BCVA: 20/25

## 2023-09-29 ASSESSMENT — AXIALLENGTH_DERIVED
OD_AL: 23.1255
OS_AL: 22.9477

## 2023-09-29 ASSESSMENT — SPHEQUIV_DERIVED
OS_SPHEQUIV: 0.875
OD_SPHEQUIV: 0.75

## 2023-09-29 ASSESSMENT — KERATOMETRY
OD_K2POWER_DIOPTERS: 45.25
OS_K2POWER_DIOPTERS: 45.50
OD_K1POWER_DIOPTERS: 42.75
OS_AXISANGLE_DEGREES: 078
OS_K1POWER_DIOPTERS: 43.25
METHOD_AUTO_MANUAL: AUTO
OD_AXISANGLE_DEGREES: 093

## 2023-09-29 ASSESSMENT — CONFRONTATIONAL VISUAL FIELD TEST (CVF)
OS_FINDINGS: FULL
OD_FINDINGS: FULL

## 2023-09-29 ASSESSMENT — SUPERFICIAL PUNCTATE KERATITIS (SPK)
OD_SPK: T
OS_SPK: T

## 2024-01-03 ENCOUNTER — APPOINTMENT (OUTPATIENT)
Dept: UROLOGY | Facility: CLINIC | Age: 64
End: 2024-01-03
Payer: MEDICAID

## 2024-01-03 VITALS
WEIGHT: 230 LBS | HEIGHT: 62 IN | DIASTOLIC BLOOD PRESSURE: 73 MMHG | BODY MASS INDEX: 42.33 KG/M2 | SYSTOLIC BLOOD PRESSURE: 168 MMHG

## 2024-01-03 PROCEDURE — 99213 OFFICE O/P EST LOW 20 MIN: CPT

## 2024-01-06 ENCOUNTER — APPOINTMENT (OUTPATIENT)
Dept: ULTRASOUND IMAGING | Facility: CLINIC | Age: 64
End: 2024-01-06
Payer: MEDICAID

## 2024-01-06 ENCOUNTER — OUTPATIENT (OUTPATIENT)
Dept: OUTPATIENT SERVICES | Facility: HOSPITAL | Age: 64
LOS: 1 days | End: 2024-01-06
Payer: MEDICAID

## 2024-01-06 DIAGNOSIS — Z00.8 ENCOUNTER FOR OTHER GENERAL EXAMINATION: ICD-10-CM

## 2024-01-06 DIAGNOSIS — N20.0 CALCULUS OF KIDNEY: ICD-10-CM

## 2024-01-06 PROCEDURE — 76770 US EXAM ABDO BACK WALL COMP: CPT | Mod: 26

## 2024-01-06 PROCEDURE — 76770 US EXAM ABDO BACK WALL COMP: CPT

## 2024-02-07 ENCOUNTER — APPOINTMENT (OUTPATIENT)
Dept: UROLOGY | Facility: CLINIC | Age: 64
End: 2024-02-07
Payer: MEDICAID

## 2024-02-07 VITALS
HEIGHT: 62 IN | DIASTOLIC BLOOD PRESSURE: 84 MMHG | BODY MASS INDEX: 36.8 KG/M2 | WEIGHT: 200 LBS | HEART RATE: 79 BPM | SYSTOLIC BLOOD PRESSURE: 146 MMHG

## 2024-02-07 PROCEDURE — 99214 OFFICE O/P EST MOD 30 MIN: CPT

## 2024-02-10 NOTE — ASSESSMENT
[FreeTextEntry1] :  plan: - new CT - follow up to review and decide on need for surgery - follow up for dietary recommendations after

## 2024-02-10 NOTE — HISTORY OF PRESENT ILLNESS
[FreeTextEntry1] : 62 yo F for follow up interview performed with Malay interpretation   see previous notes with full follow up in the past had several ultrasound images with suspected large stones, and then CT showed no stones after that had at least two ultrasounds with no stones, last one 6 months ago  reviewed recent ultrasound 1/6/2024: suspected 8 mm stone on right last 24 hr 12/9/2023: very high calcium, PH 6.6, SSCaP 2.9, high UA, very high Na 386 recently started alendronate  discussed with the patient, recommended continuing with alendronate also explained the need for new CT now  plan: - new CT - follow up to review and decide on need for surgery - follow up for dietary recommendations after

## 2024-02-17 ENCOUNTER — OUTPATIENT (OUTPATIENT)
Dept: OUTPATIENT SERVICES | Facility: HOSPITAL | Age: 64
LOS: 1 days | End: 2024-02-17
Payer: MEDICAID

## 2024-02-17 ENCOUNTER — APPOINTMENT (OUTPATIENT)
Dept: CT IMAGING | Facility: CLINIC | Age: 64
End: 2024-02-17
Payer: MEDICAID

## 2024-02-17 DIAGNOSIS — Z00.8 ENCOUNTER FOR OTHER GENERAL EXAMINATION: ICD-10-CM

## 2024-02-17 PROCEDURE — 74176 CT ABD & PELVIS W/O CONTRAST: CPT | Mod: 26

## 2024-02-17 PROCEDURE — 74176 CT ABD & PELVIS W/O CONTRAST: CPT

## 2024-03-02 ENCOUNTER — OFFICE (OUTPATIENT)
Dept: URBAN - METROPOLITAN AREA CLINIC 94 | Facility: CLINIC | Age: 64
Setting detail: OPHTHALMOLOGY
End: 2024-03-02
Payer: COMMERCIAL

## 2024-03-02 DIAGNOSIS — Z79.84: ICD-10-CM

## 2024-03-02 DIAGNOSIS — H43.393: ICD-10-CM

## 2024-03-02 DIAGNOSIS — E11.9: ICD-10-CM

## 2024-03-02 PROCEDURE — 92014 COMPRE OPH EXAM EST PT 1/>: CPT | Performed by: SPECIALIST

## 2024-03-02 PROCEDURE — 92134 CPTRZ OPH DX IMG PST SGM RTA: CPT | Performed by: SPECIALIST

## 2024-03-02 PROCEDURE — 92235 FLUORESCEIN ANGRPH MLTIFRAME: CPT | Performed by: SPECIALIST

## 2024-03-09 ENCOUNTER — RX ONLY (RX ONLY)
Age: 64
End: 2024-03-09

## 2024-03-09 ENCOUNTER — OFFICE (OUTPATIENT)
Dept: URBAN - METROPOLITAN AREA CLINIC 40 | Facility: CLINIC | Age: 64
Setting detail: OPHTHALMOLOGY
End: 2024-03-09
Payer: COMMERCIAL

## 2024-03-09 DIAGNOSIS — H40.003: ICD-10-CM

## 2024-03-09 DIAGNOSIS — H25.13: ICD-10-CM

## 2024-03-09 DIAGNOSIS — H16.223: ICD-10-CM

## 2024-03-09 PROCEDURE — 92083 EXTENDED VISUAL FIELD XM: CPT | Performed by: PHYSICIAN ASSISTANT

## 2024-03-09 PROCEDURE — 92014 COMPRE OPH EXAM EST PT 1/>: CPT | Performed by: PHYSICIAN ASSISTANT

## 2024-03-13 ENCOUNTER — APPOINTMENT (OUTPATIENT)
Dept: UROLOGY | Facility: CLINIC | Age: 64
End: 2024-03-13
Payer: COMMERCIAL

## 2024-03-13 VITALS
BODY MASS INDEX: 36.8 KG/M2 | DIASTOLIC BLOOD PRESSURE: 69 MMHG | WEIGHT: 200 LBS | HEART RATE: 72 BPM | RESPIRATION RATE: 16 BRPM | OXYGEN SATURATION: 98 % | HEIGHT: 62 IN | SYSTOLIC BLOOD PRESSURE: 127 MMHG

## 2024-03-13 PROCEDURE — 99214 OFFICE O/P EST MOD 30 MIN: CPT

## 2024-03-13 NOTE — ASSESSMENT
[FreeTextEntry1] :  plan; - follow up in 2 months with new MRI - will repeat 24 hr then - reduce salt in diet - cranberry pills

## 2024-03-13 NOTE — HISTORY OF PRESENT ILLNESS
[FreeTextEntry1] : 62 yo F  interview performed with Angolan interpretation   reviewed new CT: no stones, suspected hypodense 1.7 cm lesion in the midpole of the right kidney reviewed old CT 2022: same lesion seen, around 1.0 cm lesion looks cystic, small, and difficult to differentiated  1. suspected lesion on the kidney discussed with patient explained the need for follow up and further imaging the lesion is relatively stable, and maybe growing slowly will order a new MRI and repeat visit in 2 months  2. nephrolithiasis: previous 24 hr urine: very high Ca, high UA, very high Na will wait chlorthalidone because this was the first time with high Ca, will only repeat for now will also recommend starting with cranberry pills (PH 6.9)  plan; - follow up in 2 months with new MRI - will repeat 24 hr then - reduce salt in diet - cranberry pills

## 2024-03-24 ENCOUNTER — OUTPATIENT (OUTPATIENT)
Dept: OUTPATIENT SERVICES | Facility: HOSPITAL | Age: 64
LOS: 1 days | End: 2024-03-24
Payer: COMMERCIAL

## 2024-03-24 ENCOUNTER — APPOINTMENT (OUTPATIENT)
Dept: MRI IMAGING | Facility: CLINIC | Age: 64
End: 2024-03-24
Payer: COMMERCIAL

## 2024-03-24 DIAGNOSIS — Z00.8 ENCOUNTER FOR OTHER GENERAL EXAMINATION: ICD-10-CM

## 2024-03-24 DIAGNOSIS — N28.9 DISORDER OF KIDNEY AND URETER, UNSPECIFIED: ICD-10-CM

## 2024-03-24 PROCEDURE — 74183 MRI ABD W/O CNTR FLWD CNTR: CPT

## 2024-03-24 PROCEDURE — A9585: CPT

## 2024-03-24 PROCEDURE — 74183 MRI ABD W/O CNTR FLWD CNTR: CPT | Mod: 26

## 2024-05-08 ENCOUNTER — APPOINTMENT (OUTPATIENT)
Dept: UROLOGY | Facility: CLINIC | Age: 64
End: 2024-05-08

## 2024-05-09 ENCOUNTER — APPOINTMENT (OUTPATIENT)
Dept: OTOLARYNGOLOGY | Facility: CLINIC | Age: 64
End: 2024-05-09
Payer: COMMERCIAL

## 2024-05-09 VITALS — WEIGHT: 197 LBS | HEIGHT: 62 IN | BODY MASS INDEX: 36.25 KG/M2

## 2024-05-09 DIAGNOSIS — J32.0 CHRONIC MAXILLARY SINUSITIS: ICD-10-CM

## 2024-05-09 DIAGNOSIS — R13.12 DYSPHAGIA, OROPHARYNGEAL PHASE: ICD-10-CM

## 2024-05-09 DIAGNOSIS — H90.3 SENSORINEURAL HEARING LOSS, BILATERAL: ICD-10-CM

## 2024-05-09 DIAGNOSIS — K21.9 GASTRO-ESOPHAGEAL REFLUX DISEASE W/OUT ESOPHAGITIS: ICD-10-CM

## 2024-05-09 PROCEDURE — 92557 COMPREHENSIVE HEARING TEST: CPT

## 2024-05-09 PROCEDURE — 92567 TYMPANOMETRY: CPT

## 2024-05-09 PROCEDURE — 99204 OFFICE O/P NEW MOD 45 MIN: CPT | Mod: 25

## 2024-05-09 PROCEDURE — 31231 NASAL ENDOSCOPY DX: CPT

## 2024-05-09 RX ORDER — OMEPRAZOLE 40 MG/1
40 CAPSULE, DELAYED RELEASE ORAL
Qty: 90 | Refills: 0 | Status: ACTIVE | COMMUNITY
Start: 2024-05-09 | End: 1900-01-01

## 2024-05-09 NOTE — REASON FOR VISIT
[Initial Consultation] : an initial consultation for [Other: ______] : provided by LEONIE [Source: ______] : History obtained from [unfilled] [FreeTextEntry2] : throat [Interpreters_IDNumber] : [id number] [Interpreters_FullName] : nehemias

## 2024-05-09 NOTE — HISTORY OF PRESENT ILLNESS
[de-identified] : c/o discomfort with swallowing - feels bloating in stomach.  Problem for about one year.  No problems breathing.  No voice issues.  Feels issues with solid and liquids.   Eats a lot of spicy foods, coffee and acidic foods.   No snacking No hemoptysis  Also has noted recetnly occ problems hearing.

## 2024-05-09 NOTE — DATA REVIEWED
[de-identified] :  Type A tymps, AU. Testing via headphones: Essentially borderline-normal / mild SNHL, AU.

## 2024-05-09 NOTE — ASSESSMENT
[FreeTextEntry1] : Patient c/o problems swallowing and discomfort in stomach.  Has findings of LPR - no sinusitis or PND - recommended omeprazole before breakfast and reflux diet.  Will also get MBS and also recommended GI eval. Also concerns about hearing - has mild snhl - recommended yearly audio Followup 2 mos

## 2024-05-22 ENCOUNTER — APPOINTMENT (OUTPATIENT)
Dept: UROLOGY | Facility: CLINIC | Age: 64
End: 2024-05-22
Payer: COMMERCIAL

## 2024-05-22 VITALS
HEART RATE: 70 BPM | WEIGHT: 197 LBS | HEIGHT: 62 IN | DIASTOLIC BLOOD PRESSURE: 81 MMHG | OXYGEN SATURATION: 98 % | SYSTOLIC BLOOD PRESSURE: 154 MMHG | BODY MASS INDEX: 36.25 KG/M2

## 2024-05-22 DIAGNOSIS — N20.0 CALCULUS OF KIDNEY: ICD-10-CM

## 2024-05-22 DIAGNOSIS — N28.1 CYST OF KIDNEY, ACQUIRED: ICD-10-CM

## 2024-05-22 DIAGNOSIS — N28.9 DISORDER OF KIDNEY AND URETER, UNSPECIFIED: ICD-10-CM

## 2024-05-22 PROCEDURE — G2211 COMPLEX E/M VISIT ADD ON: CPT

## 2024-05-22 PROCEDURE — 99215 OFFICE O/P EST HI 40 MIN: CPT

## 2024-05-22 RX ORDER — CHLORTHALIDONE 25 MG/1
25 TABLET ORAL DAILY
Qty: 90 | Refills: 3 | Status: ACTIVE | COMMUNITY
Start: 2024-05-22 | End: 1900-01-01

## 2024-05-30 PROBLEM — N20.0 NEPHROLITHIASIS: Status: ACTIVE | Noted: 2022-07-29

## 2024-05-30 PROBLEM — N28.9 RENAL LESION: Status: ACTIVE | Noted: 2024-03-13

## 2024-05-30 PROBLEM — N28.1 RENAL CYST: Status: ACTIVE | Noted: 2022-07-13

## 2024-05-30 NOTE — HISTORY OF PRESENT ILLNESS
[FreeTextEntry1] : 64 yo F  interview performed with [place] interpretation   reviewed new CT: no stones, suspected hypodense 1.7 cm lesion in the midpole of the right kidney reviewed old CT 2022: same lesion seen, around 1.0 cm lesion looks cystic, small, and difficult to differentiated  1. suspected lesion on the kidney previous CT 02/2024: no stones, suspected hypodense 1.7 cm lesion in the midpole of the right kidney old CT 2022: same lesion seen, around 1.0 cm reviewed MRI 3/24/2024: bilateral renal cysts, right 1.6cm cyst corresponding to the finding on CT, no enhancing masses. 3 mm cystic lesion on the pancreatic tail, recommended follow up  2. nephrolithiasis: last CT 02/2024: no stones will wait chlorthalidone because this was the first time with high Ca, will only repeat for now will also recommend starting with cranberry pills (PH 6.9) reviewed 24 hr urine: very high Ca, high UA, very high Na   plan: - reduce salt in diet - lemon and lime - reduce animal protein - start chlorthalidone - PTH - follow up in 2 months with ultrasound and 24

## 2024-05-30 NOTE — ASSESSMENT
[FreeTextEntry1] :  plan: - reduce salt in diet - lemon and lime - reduce animal protein - start chlorthalidone - PTH - follow up in 2 months with ultrasound and 24 Mildly to Moderately Impaired: difficulty hearing in some environments or speaker may need to increase volume or speak distinctly

## 2024-06-26 ENCOUNTER — OUTPATIENT (OUTPATIENT)
Dept: OUTPATIENT SERVICES | Facility: HOSPITAL | Age: 64
LOS: 1 days | End: 2024-06-26
Payer: COMMERCIAL

## 2024-06-26 DIAGNOSIS — R13.12 DYSPHAGIA, OROPHARYNGEAL PHASE: ICD-10-CM

## 2024-06-26 PROCEDURE — 74230 X-RAY XM SWLNG FUNCJ C+: CPT

## 2024-06-26 PROCEDURE — 92611 MOTION FLUOROSCOPY/SWALLOW: CPT

## 2024-06-26 PROCEDURE — 74230 X-RAY XM SWLNG FUNCJ C+: CPT | Mod: 26

## 2024-06-27 ENCOUNTER — NON-APPOINTMENT (OUTPATIENT)
Age: 64
End: 2024-06-27

## 2024-07-20 ENCOUNTER — OUTPATIENT (OUTPATIENT)
Dept: OUTPATIENT SERVICES | Facility: HOSPITAL | Age: 64
LOS: 1 days | End: 2024-07-20
Payer: COMMERCIAL

## 2024-07-20 ENCOUNTER — APPOINTMENT (OUTPATIENT)
Dept: ULTRASOUND IMAGING | Facility: CLINIC | Age: 64
End: 2024-07-20
Payer: COMMERCIAL

## 2024-07-20 DIAGNOSIS — N20.0 CALCULUS OF KIDNEY: ICD-10-CM

## 2024-07-20 PROCEDURE — 76770 US EXAM ABDO BACK WALL COMP: CPT | Mod: 26

## 2024-07-20 PROCEDURE — 76770 US EXAM ABDO BACK WALL COMP: CPT

## 2024-08-14 ENCOUNTER — APPOINTMENT (OUTPATIENT)
Dept: UROLOGY | Facility: CLINIC | Age: 64
End: 2024-08-14
Payer: COMMERCIAL

## 2024-08-14 VITALS
DIASTOLIC BLOOD PRESSURE: 77 MMHG | BODY MASS INDEX: 35.88 KG/M2 | HEIGHT: 62 IN | SYSTOLIC BLOOD PRESSURE: 154 MMHG | WEIGHT: 195 LBS | HEART RATE: 78 BPM

## 2024-08-14 DIAGNOSIS — R82.991 HYPOCITRATURIA: ICD-10-CM

## 2024-08-14 DIAGNOSIS — N28.1 CYST OF KIDNEY, ACQUIRED: ICD-10-CM

## 2024-08-14 DIAGNOSIS — R82.994 HYPERCALCIURIA: ICD-10-CM

## 2024-08-14 DIAGNOSIS — N20.0 CALCULUS OF KIDNEY: ICD-10-CM

## 2024-08-14 DIAGNOSIS — E87.0 HYPEROSMOLALITY AND HYPERNATREMIA: ICD-10-CM

## 2024-08-14 PROCEDURE — 99214 OFFICE O/P EST MOD 30 MIN: CPT

## 2024-08-14 NOTE — HISTORY OF PRESENT ILLNESS
[FreeTextEntry1] : 65 yo F  interview performed with [place] interpretation  history of nephrolithiasis  last CT 02/2024 - no stones started on chlorthalidone last visit  here for follow up still no Parathyroid testing reviewed new ultrasound 7/20/2024: no stones, known cyst reviewed 24 hr 6/2024: high calcium (improving), very high Na, good UA, low citrate, PH 7.1   plan; - test parathyroid now - TTM in 2 weeks to review - if normal will plan for follow up in 4 months with ultrasound and 24 hr - continue with chlorthalidone - reduce salt in diet

## 2024-08-14 NOTE — ASSESSMENT
[FreeTextEntry1] :  plan; - test parathyroid now - TTM in 2 weeks to review - if normal will plan for follow up in 4 months with ultrasound and 24 hr - continue with chlorthalidone - reduce salt in diet

## 2025-05-08 ENCOUNTER — APPOINTMENT (OUTPATIENT)
Dept: GASTROENTEROLOGY | Facility: CLINIC | Age: 65
End: 2025-05-08
Payer: COMMERCIAL

## 2025-05-08 VITALS
RESPIRATION RATE: 16 BRPM | BODY MASS INDEX: 35.88 KG/M2 | HEIGHT: 62 IN | HEART RATE: 70 BPM | DIASTOLIC BLOOD PRESSURE: 80 MMHG | WEIGHT: 195 LBS | SYSTOLIC BLOOD PRESSURE: 130 MMHG | OXYGEN SATURATION: 98 %

## 2025-05-08 DIAGNOSIS — K21.9 GASTRO-ESOPHAGEAL REFLUX DISEASE W/OUT ESOPHAGITIS: ICD-10-CM

## 2025-05-08 DIAGNOSIS — K59.00 CONSTIPATION, UNSPECIFIED: ICD-10-CM

## 2025-05-08 DIAGNOSIS — R13.12 DYSPHAGIA, OROPHARYNGEAL PHASE: ICD-10-CM

## 2025-05-08 DIAGNOSIS — R07.0 PAIN IN THROAT: ICD-10-CM

## 2025-05-08 DIAGNOSIS — R13.10 DYSPHAGIA, UNSPECIFIED: ICD-10-CM

## 2025-05-08 PROCEDURE — 99204 OFFICE O/P NEW MOD 45 MIN: CPT | Mod: 25

## 2025-05-12 LAB
ALBUMIN SERPL ELPH-MCNC: 4.6 G/DL
ALP BLD-CCNC: 59 U/L
ALT SERPL-CCNC: 18 U/L
ANION GAP SERPL CALC-SCNC: 14 MMOL/L
AST SERPL-CCNC: 23 U/L
BILIRUB SERPL-MCNC: 0.9 MG/DL
BUN SERPL-MCNC: 11 MG/DL
CALCIUM SERPL-MCNC: 9.3 MG/DL
CHLORIDE SERPL-SCNC: 102 MMOL/L
CO2 SERPL-SCNC: 25 MMOL/L
CREAT SERPL-MCNC: 0.54 MG/DL
EGFRCR SERPLBLD CKD-EPI 2021: 104 ML/MIN/1.73M2
GLUCOSE SERPL-MCNC: 113 MG/DL
HCT VFR BLD CALC: 42.8 %
HGB BLD-MCNC: 14.1 G/DL
MCHC RBC-ENTMCNC: 27.9 PG
MCHC RBC-ENTMCNC: 32.9 G/DL
MCV RBC AUTO: 84.6 FL
PLATELET # BLD AUTO: 324 K/UL
POTASSIUM SERPL-SCNC: 4 MMOL/L
PROT SERPL-MCNC: 6.9 G/DL
RBC # BLD: 5.06 M/UL
RBC # FLD: 13.1 %
SODIUM SERPL-SCNC: 141 MMOL/L
WBC # FLD AUTO: 9.14 K/UL

## 2025-05-14 ENCOUNTER — OUTPATIENT (OUTPATIENT)
Dept: OUTPATIENT SERVICES | Facility: HOSPITAL | Age: 65
LOS: 1 days | End: 2025-05-14
Payer: COMMERCIAL

## 2025-05-14 DIAGNOSIS — R13.12 DYSPHAGIA, OROPHARYNGEAL PHASE: ICD-10-CM

## 2025-05-14 PROCEDURE — 74220 X-RAY XM ESOPHAGUS 1CNTRST: CPT | Mod: 26

## 2025-05-14 PROCEDURE — 74220 X-RAY XM ESOPHAGUS 1CNTRST: CPT

## 2025-05-15 ENCOUNTER — RX RENEWAL (OUTPATIENT)
Age: 65
End: 2025-05-15

## 2025-07-22 RX ORDER — METFORMIN HYDROCHLORIDE 500 MG/1
500 TABLET, COATED ORAL
Refills: 0 | Status: ACTIVE | COMMUNITY

## 2025-07-25 ENCOUNTER — APPOINTMENT (OUTPATIENT)
Dept: GASTROENTEROLOGY | Facility: GI CENTER | Age: 65
End: 2025-07-25
Payer: COMMERCIAL

## 2025-07-25 ENCOUNTER — RESULT REVIEW (OUTPATIENT)
Age: 65
End: 2025-07-25

## 2025-07-25 ENCOUNTER — OUTPATIENT (OUTPATIENT)
Dept: OUTPATIENT SERVICES | Facility: HOSPITAL | Age: 65
LOS: 1 days | End: 2025-07-25
Payer: COMMERCIAL

## 2025-07-25 ENCOUNTER — TRANSCRIPTION ENCOUNTER (OUTPATIENT)
Age: 65
End: 2025-07-25

## 2025-07-25 DIAGNOSIS — R13.12 DYSPHAGIA, OROPHARYNGEAL PHASE: ICD-10-CM

## 2025-07-25 DIAGNOSIS — R13.10 DYSPHAGIA, UNSPECIFIED: ICD-10-CM

## 2025-07-25 DIAGNOSIS — K21.9 GASTRO-ESOPHAGEAL REFLUX DISEASE W/OUT ESOPHAGITIS: ICD-10-CM

## 2025-07-25 LAB — GLUCOSE BLDC GLUCOMTR-MCNC: 104 MG/DL — HIGH (ref 70–99)

## 2025-07-25 PROCEDURE — 43239 EGD BIOPSY SINGLE/MULTIPLE: CPT

## 2025-07-25 PROCEDURE — C1889: CPT

## 2025-07-25 PROCEDURE — 88342 IMHCHEM/IMCYTCHM 1ST ANTB: CPT

## 2025-07-25 PROCEDURE — 82962 GLUCOSE BLOOD TEST: CPT

## 2025-07-25 PROCEDURE — 88342 IMHCHEM/IMCYTCHM 1ST ANTB: CPT | Mod: 26

## 2025-07-25 PROCEDURE — 91040 ESOPH BALLOON DISTENSION TST: CPT | Mod: 26,59

## 2025-07-25 PROCEDURE — 88305 TISSUE EXAM BY PATHOLOGIST: CPT | Mod: 26

## 2025-07-25 PROCEDURE — 88305 TISSUE EXAM BY PATHOLOGIST: CPT

## 2025-07-25 DEVICE — CATH ENDOFLIP MEASURE 16MM: Type: IMPLANTABLE DEVICE | Status: FUNCTIONAL

## 2025-07-29 LAB — SURGICAL PATHOLOGY STUDY: SIGNIFICANT CHANGE UP

## 2025-08-15 ENCOUNTER — RX RENEWAL (OUTPATIENT)
Age: 65
End: 2025-08-15

## 2025-09-03 ENCOUNTER — APPOINTMENT (OUTPATIENT)
Dept: GASTROENTEROLOGY | Facility: CLINIC | Age: 65
End: 2025-09-03
Payer: COMMERCIAL

## 2025-09-03 VITALS
HEART RATE: 70 BPM | OXYGEN SATURATION: 98 % | WEIGHT: 198 LBS | SYSTOLIC BLOOD PRESSURE: 130 MMHG | HEIGHT: 62 IN | RESPIRATION RATE: 16 BRPM | DIASTOLIC BLOOD PRESSURE: 80 MMHG | BODY MASS INDEX: 36.44 KG/M2

## 2025-09-03 DIAGNOSIS — K22.0 ACHALASIA OF CARDIA: ICD-10-CM

## 2025-09-03 PROCEDURE — 99214 OFFICE O/P EST MOD 30 MIN: CPT

## (undated) DEVICE — FORCEP ENDOJAW AGTR LC W NDL 2.8MM 230CM DISP

## (undated) DEVICE — KIT DEFENDO 4 OLY 4 PC